# Patient Record
Sex: MALE | Race: WHITE
[De-identification: names, ages, dates, MRNs, and addresses within clinical notes are randomized per-mention and may not be internally consistent; named-entity substitution may affect disease eponyms.]

---

## 2019-01-07 ENCOUNTER — HOSPITAL ENCOUNTER (OUTPATIENT)
Dept: HOSPITAL 92 - BICMRI | Age: 49
Discharge: HOME | End: 2019-01-07
Payer: COMMERCIAL

## 2019-01-07 DIAGNOSIS — G93.9: ICD-10-CM

## 2019-01-07 DIAGNOSIS — R26.81: Primary | ICD-10-CM

## 2019-01-07 PROCEDURE — 70551 MRI BRAIN STEM W/O DYE: CPT

## 2019-01-07 NOTE — MRI
MRI BRAIN WITHOUT CONTRAST:

 

INDICATIONS:

History of unsteady gait, worsening over the last year.

 

COMPARISON:

None.

 

TECHNIQUE:

Multiplanar, multisequence MR images were obtained of the brain without IV contrast.

 

FINDINGS:

There is prominent hydrocephalus of the lateral ventricles with transependymal flow of CSF.  There is
 a 1.1 cm spherical mass seen at the confluence of the foramen of Monro, near the superior and anteri
or aspect of the third ventricle.  This lesion is T1 hyperintense and heterogeneous on T2-weighted im
ages.  No additional focal intracranial mass is grossly evident.  No definite restricted diffusion is
 evident to suggest acute infarction.  No intracranial hemorrhage is grossly evident.  There is a mil
d amount of mucosal thickening of the ethmoid air cells and maxillary sinuses.

 

IMPRESSION:

T1 hyperintense, spherical mass seen near the foramen of Monro, causing severe hydrocephalus of the l
ateral ventricles bilaterally.  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  Other diffe
rential considerations include a partially thrombosed aneurysm or possibly an additional neoplasm, rivera
ch as a craniopharyngioma or subependymoma, which are felt to be less likely.  Would recommend follow
-up MRI of the brain with and without contrast for additional characterization.  A neurosurgical cons
ultation is also recommended.

 

CODE T

 

POS: [facility]

## 2019-01-21 NOTE — HP
HISTORY OF PRESENT ILLNESS:  Mr. Mendez is referred to us by Dr. Javed, for possible

endoscopic removal of a colloid cyst.  Mr. Mendez is a 49-year-old gentleman, who

has noticed a 2 or 3-year decline in his gait, now with a difficult time lifting his

feet off the floor.  He and his parents do not notice change in cognition or

lateralizing motor loss.  There is no history of syncope.  No loss of vision with

cough or sneeze.  He is here after MR imaging showing obstructive HCP from a cyst at

the foramen of Monro.  Dr. Javed sent him for endoscopic procedure. 



PAST MEDICAL HISTORY:  Anxiety, sciatica, chronic obstructive pulmonary disease,

tobacco use, hypertriglyceridemia severe, and unsteady gait. 



REVIEW OF SYSTEMS:  A 10-point review of systems has been completed and is negative

other than stated in the above HPI. 



PAST SURGICAL HISTORY:  Denies past surgical history.



FAMILY HISTORY:  Noncontributory.



SOCIAL HISTORY:  The patient is a smoker, smokes approximately a pack a day for the

past 30 years.  States that he drinks alcohol and does not use any other illicit

medications. 



PHYSICAL EXAMINATION:

CONSTITUTION:  Alert, oriented, afebrile, and normotensive.  Does not appear to be

in any visible distress. 

HEENT:  Normocephalic and atraumatic.  Pupils are equal, round, and react to light.

Extraocular movements are intact.  Hearing is intact.  Moist mucous membranes. 

RESPIRATIONS:  Normal work of breathing on room air. 

CARDIO:  Regular rate and rhythm. 

NEUROLOGIC:  Cranial nerves intact.  Did not measure size of blind spot.  Cerebellar

exam, there is no truncal ataxia.  Gait and station, magnetic apraxia of gait

severe.  Motor exam, no drift.  Sensory exam, no neglect. 



IMAGING:  MRI cyst lesion in foramen of Monro, proteinaceous fluid like colloid cyst.



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  Dr. Toussaint

has offered surgery.  We have discussed endoscopic resection.  If that fails, we

will do an open transcortical or open interhemispheric surgery,  shunting

possible. 



INFORMED CONSENT:  We have discussed the indications, risks, benefits, alternatives,

and expected results of surgery.  The risks discussed included, but are not limited

to, infection, bleeding, CSF leak, brain damage, blindness, memory loss, pituitary

dysfunction, significant loss of neurological function, seizure, stroke, dependence

of normal care, death.  Long-term complications discussed include, but are not

limited to, recurrence and the need for further surgery. 



The patient states that he understands the risks and is willing to proceed.







Job ID:  [id number]

## 2019-01-22 ENCOUNTER — HOSPITAL ENCOUNTER (INPATIENT)
Dept: HOSPITAL 92 - SURG A | Age: 49
LOS: 1 days | Discharge: HOME | DRG: 27 | End: 2019-01-23
Attending: NEUROLOGICAL SURGERY | Admitting: NEUROLOGICAL SURGERY
Payer: COMMERCIAL

## 2019-01-22 VITALS — BODY MASS INDEX: 29.2 KG/M2

## 2019-01-22 DIAGNOSIS — G91.1: ICD-10-CM

## 2019-01-22 DIAGNOSIS — F17.210: ICD-10-CM

## 2019-01-22 DIAGNOSIS — F41.9: ICD-10-CM

## 2019-01-22 DIAGNOSIS — E78.1: ICD-10-CM

## 2019-01-22 DIAGNOSIS — M54.30: ICD-10-CM

## 2019-01-22 DIAGNOSIS — Q04.6: Primary | ICD-10-CM

## 2019-01-22 DIAGNOSIS — Z79.82: ICD-10-CM

## 2019-01-22 DIAGNOSIS — J44.9: ICD-10-CM

## 2019-01-22 LAB
APTT PPP: 24.5 SEC (ref 22.9–36.1)
BASOPHILS # BLD AUTO: 0.1 THOU/UL (ref 0–0.2)
BASOPHILS NFR BLD AUTO: 1.4 % (ref 0–1)
EOSINOPHIL # BLD AUTO: 0.3 THOU/UL (ref 0–0.7)
EOSINOPHIL NFR BLD AUTO: 3.5 % (ref 0–10)
HGB BLD-MCNC: 17.1 G/DL (ref 14–18)
INR PPP: 0.9
LYMPHOCYTES # BLD: 3.5 THOU/UL (ref 1.2–3.4)
LYMPHOCYTES NFR BLD AUTO: 36.4 % (ref 21–51)
MCH RBC QN AUTO: 33.3 PG (ref 27–31)
MCV RBC AUTO: 101 FL (ref 78–98)
MONOCYTES # BLD AUTO: 0.8 THOU/UL (ref 0.11–0.59)
MONOCYTES NFR BLD AUTO: 8 % (ref 0–10)
NEUTROPHILS # BLD AUTO: 4.9 THOU/UL (ref 1.4–6.5)
NEUTROPHILS NFR BLD AUTO: 50.8 % (ref 42–75)
PLATELET # BLD AUTO: 208 THOU/UL (ref 130–400)
PROTHROMBIN TIME: 12.5 SEC (ref 12–14.7)
RBC # BLD AUTO: 5.13 MILL/UL (ref 4.7–6.1)
WBC # BLD AUTO: 9.6 THOU/UL (ref 4.8–10.8)

## 2019-01-22 PROCEDURE — 85025 COMPLETE CBC W/AUTO DIFF WBC: CPT

## 2019-01-22 PROCEDURE — 85730 THROMBOPLASTIN TIME PARTIAL: CPT

## 2019-01-22 PROCEDURE — 009640Z DRAINAGE OF CEREBRAL VENTRICLE WITH DRAINAGE DEVICE, PERCUTANEOUS ENDOSCOPIC APPROACH: ICD-10-PCS | Performed by: NEUROLOGICAL SURGERY

## 2019-01-22 PROCEDURE — 93010 ELECTROCARDIOGRAM REPORT: CPT

## 2019-01-22 PROCEDURE — 85610 PROTHROMBIN TIME: CPT

## 2019-01-22 PROCEDURE — 00B64ZZ EXCISION OF CEREBRAL VENTRICLE, PERCUTANEOUS ENDOSCOPIC APPROACH: ICD-10-PCS | Performed by: NEUROLOGICAL SURGERY

## 2019-01-22 PROCEDURE — P9045 ALBUMIN (HUMAN), 5%, 250 ML: HCPCS

## 2019-01-22 PROCEDURE — S0028 INJECTION, FAMOTIDINE, 20 MG: HCPCS

## 2019-01-22 PROCEDURE — 36415 COLL VENOUS BLD VENIPUNCTURE: CPT

## 2019-01-22 PROCEDURE — 70450 CT HEAD/BRAIN W/O DYE: CPT

## 2019-01-22 PROCEDURE — 93005 ELECTROCARDIOGRAM TRACING: CPT

## 2019-01-22 PROCEDURE — 88307 TISSUE EXAM BY PATHOLOGIST: CPT

## 2019-01-22 RX ADMIN — CEFAZOLIN SODIUM SCH MLS: 2 SOLUTION INTRAVENOUS at 22:56

## 2019-01-22 RX ADMIN — CEFAZOLIN SODIUM SCH: 2 SOLUTION INTRAVENOUS at 21:08

## 2019-01-22 NOTE — OP
DATE OF PROCEDURE:  01/22/2019



ASSISTANT:  Katie Mclean PA-C.



PREOPERATIVE INDICATION:  Prevent neurological deterioration.



PREOPERATIVE DIAGNOSIS:  Obstructive hydrocephalus from a midline foramen of Monro

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 



POSTOPERATIVE DIAGNOSIS:  Obstructive hydrocephalus from a midline foramen of Monro

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 



OPERATIVE PROCEDURE:  Neuroendoscopic resection of colloid cyst, third

ventriculostomy with endoscope, placement of external ventricular drain. 



PREOPERATIVE MEDICATIONS:  Ancef 2 g IV.



DRAIN NUMBER:  1.



DRAIN TYPE:  External ventricular.



DESCRIPTION OF PROCEDURE:  The patient was brought to the operating room.  General

endotracheal anesthesia was induced.  The head was immobilized with Coyle pin and

headholder.  Hair was removed from the scalp with electric clippers and we marked

out the coronal suture and the sagittal suture.  In the midpupillary line, we

planned a curvilinear incision anterior to the coronal suture as well as an exit

point for the EVD.  Under our planned incisions, we infused local anesthetic.  The

scalp was sterilely prepped and draped.  We opened a right-sided curvilinear frontal

incision with a 10 blade knife and controlled bleeding with bipolar cautery.  We

folded our scalp flap backwards and placed a self-retaining retractor.  A ang hole

was placed anterior to the coronal suture in the midpupillary line.  We waxed the

edges of the bone.  We used bipolar cautery to coagulate the dura and opened the

dura in a cruciate fashion.  We coagulated the alexandra gently and opened the alexandra with an

11 blade knife.  We introduced the plastic sheaths into the ventricular system sized

for the smaller of two endoscopes.  At about 4 cm, we entered the ventricular

system.  We advanced to 5 cm and stapled the sheath to the drapes.  We brought our

smaller endoscope into the field, advanced through the sheath into the ventricular

system and could easily visualize the foramen of Monro of the anterior septal vein,

thalamostriate veins, and the choroid plexus.  There was a large cyst in the foramen

of Monro stretching the tenia fornicis and the tenia thalami at the level of the

foramen of Monro.  The cyst protruded anteriorly under the thalamostriate and was

easily visualized through the foramen of Monro as well.  We then used a bipolar

probe and gently coagulated the anterior surface of the cyst and removed a small

portion of the wall.  The cyst contents were semisolid and needed a larger bore

endoscope for removal.  I removed the small endoscope and the small sheath and

introduced a larger sheath through the same trajectory into the ventricular system.

Through this larger sheath, we then introduced a larger endoscope over the working

channel sized appropriately for the amount of solid cyst contents.  We used an

articulated arm to mobilize the endoscope and irrigated through the scope.  While

the irrigation going, we used multiple instruments to remove both cyst wall and cyst

contents.  We maneuvered through the foramen of Monro and all of our cystectomy was

done there.  The cyst rotated nicely into the foramen, in fact so much so that we

could see the back wall of the cyst in the left lateral portion of the cyst.  We

kept the removing cyst wall and cyst contents until all the proteinaceous material

was removed.  We then brought the smaller endoscope back into the field.  We

advanced a smaller endoscope through the foramen of Monro.  We could visualize the

mammillary bodies of suprachiasmatic recess in the floor of the third ventricle.  We

made a small third ventriculostomy to help with CSF circulation should any of the

cyst or proteinaceous material clogged aqueduct.  We irrigated copiously with

irrigation through our scope.  We removed the scope and placed the external

ventricular drain catheter through the sheath and into the ventricular system.  The

sheath was removed from either side and the catheter kept in place.  We tunneled

posteriorly through a separate stab incision and connected our EVD catheter to a

Oswald drainage system.  We irrigated with bacitracin irrigation.  We closed the

wound in anatomical layers and we applied sterile dressings.  The EVD catheter was

tacked down to the scalp.  The drainage system was left open 

for 1 hour at the height of zero to allow blood and proteinaceous contents to be

removed through the EVD. 



The patient was removed from the [place] pin and headholder, and transferred to the

transport cart and taken to the recovery room. 







Job ID:  [id number]

## 2019-01-23 VITALS — TEMPERATURE: 98.4 F

## 2019-01-23 RX ADMIN — CEFAZOLIN SODIUM SCH MLS: 2 SOLUTION INTRAVENOUS at 06:39

## 2019-01-23 NOTE — PRG
DATE OF SERVICE:  01/23/2019



SUBJECTIVE:  I saw Tre Mendez in the ICU room this morning.  The EVD is in

place and working well.  The pressures have been low overnight and the drain is not

needed to be opened since the 1 hour in the PACU.  Mr. Mendez has no complaints and

wants to go home. 



OBJECTIVE:  There are no fevers recorded on our electronic chart.  Blood pressures

have been in the normal range.  His neurological examination is at baseline. 



ASSESSMENT AND PLAN:  I went over results and findings of the operation with Mr. Mendez.  I went over ongoing wound care.  I asked him to keep his to pay (hair

prosthesis) off his incision at least until we see him in followup in 2 weeks.  He

can wear a hat, but I do not want anything closely approximated to the incision,

lest it stay warm moist and get infected.  He expressed his understanding. 



I went over home going activity restrictions, wound care, and followup arrangements.

 He verbalized his understanding.  We will take the drain out and discharge him home

today. 







Job ID:  [id number]

## 2019-01-23 NOTE — CT
PRELIMINARY REPORT/[facility]/EMERGENTY AFTER-HOURS PROCEDURE 

 

CT Head Without Contrast

 

EXAM DATE/TIME:

1/23/2019 3:56 AM

 

CLINICAL HISTORY:

49 years old, male; Device placement; Cerebral fluid drainiage device or shunt; Prior surgery; Surger
y date: Post-operative (0-2 days); Patient HX: S/P craniotomy

 

TECHNIQUE:

Axial computed tomography images of the head/brain without contrast.

 

COMPARISON:

MRI Brain WO Con 1/7/2019 4:05 PM

 

FINDINGS:

Tubes, catheters and devices: Right frontal  shunt tube has been placed in the interval, tip is in 
the right lateral ventricle. Small amount of intraventricular blood now present, presumably secondary
 to recent shunt tube placement. Follow up will be helpful to exclude progression.

Brain: No significant mass effect or midline shift. Small amount of postoperative intracranial gas. T
here is relatively symmetrical decreased attenuation in the periventricular white matter, possibly fr
om microvascular disease. Another possibility would be transependymal absorption of fluid due to the 
hydrocephalus.

No definite acute infarct by CT.

Ventricles: Lateral ventricles remain dilated, similar to the recent MRI exam.

The suspected third ventricle colloid cyst seen on recent MRI exam, is likely still present, not as w
ell visualized by CT.

Bones/joints: No definite acute skull fracture.

Sinuses: Included paranasal sinuses are essentially clear.

Mastoid air cells: No significant acute finding.

Soft tissues: There is subcutaneous gas in the right scalp, related to recent surgery.

 

IMPRESSION:

1. Interval placement of  shunt tube, details above.

2. Lateral ventricles remain dilated, similar to recent exam.

3. Small amount of intraventricular blood now present, see above.

4. No other evidence for intracranial hemorrhage.

5. Postsurgical and other findings discussed above.

 

Thank you for allowing us to participate in the care of your patient.

Dictated and Authenticated by: Bonifacio Snell MD

01/23/2019 4:30 AM Central Time ([place] [employer] [place])

 

 

FINAL REPORT

CT HEAD NONCONTRAST:

 

Date: 1-23-19 

Performed on emergency basis at 0357 hours. 

 

History: Ventriculostomy drain placement. 

 

Comparison: MRI brain 1-7-19

 

FINDINGS: 

I agree with the preliminary report by Dr. Guzman from [facility]. Right frontal ventriculosto
my catheter is now in place. Ventricular dilatation is similar to the recent MRI exam. Small amount o
f blood within the dependent portion of the each lateral ventricle. Small amount of pneumocephalus re
dyana. 

 

Code QA

 

 

 

POS: [facility]

## 2019-03-26 ENCOUNTER — HOSPITAL ENCOUNTER (OUTPATIENT)
Dept: HOSPITAL 9 - MADRAD | Age: 49
Discharge: HOME | End: 2019-03-26
Payer: COMMERCIAL

## 2019-03-26 DIAGNOSIS — G91.9: ICD-10-CM

## 2019-03-26 DIAGNOSIS — Z98.890: ICD-10-CM

## 2019-03-26 DIAGNOSIS — Q04.6: Primary | ICD-10-CM

## 2019-03-26 PROCEDURE — 70450 CT HEAD/BRAIN W/O DYE: CPT

## 2019-03-26 NOTE — CT
CT HEAD NONCONTRAST:

3/26/19

 

HISTORY: 

Dizziness. Recent surgery. 

 

COMPARISON:  

1/23/19.

 

FINDINGS:  

There is no evidence of acute intracranial hemorrhage or infarct. Ventricular dilatation is slightly 
less pronounced than on the prior study, with moderate to severe hydrocephalus remaining. Postoperati
ve changes right frontal lobe with encephalomalacia in the area of prior shunt. Septum pellucidum is 
midline. 

 

IMPRESSION:  

Postoperative changes. No shunt remains. Moderate to severe hydrocephalus, slightly improved from the
 prior study. No acute intracranial abnormalities are demonstrated. 

 

POS: [facility]